# Patient Record
Sex: MALE | Race: AMERICAN INDIAN OR ALASKA NATIVE | NOT HISPANIC OR LATINO | URBAN - METROPOLITAN AREA
[De-identification: names, ages, dates, MRNs, and addresses within clinical notes are randomized per-mention and may not be internally consistent; named-entity substitution may affect disease eponyms.]

---

## 2019-11-11 ENCOUNTER — EMERGENCY (EMERGENCY)
Facility: HOSPITAL | Age: 19
LOS: 1 days | Discharge: HOME | End: 2019-11-11
Attending: EMERGENCY MEDICINE | Admitting: EMERGENCY MEDICINE
Payer: SELF-PAY

## 2019-11-11 VITALS
HEART RATE: 91 BPM | SYSTOLIC BLOOD PRESSURE: 109 MMHG | DIASTOLIC BLOOD PRESSURE: 43 MMHG | RESPIRATION RATE: 16 BRPM | TEMPERATURE: 98 F

## 2019-11-11 PROCEDURE — 99283 EMERGENCY DEPT VISIT LOW MDM: CPT

## 2019-11-11 NOTE — ED ADULT TRIAGE NOTE - CHIEF COMPLAINT QUOTE
Ambulatory c/o cold/flu symptoms from yesterday associated with stomach upset, reports "I was outside without a jacket". Denies fevers, SOB, CP, or PMH

## 2019-11-12 RX ORDER — ACETAMINOPHEN 500 MG
2 TABLET ORAL
Qty: 60 | Refills: 0
Start: 2019-11-12 | End: 2019-11-16

## 2019-11-12 RX ORDER — OXYMETAZOLINE HYDROCHLORIDE 0.5 MG/ML
2 SPRAY NASAL ONCE
Refills: 0 | Status: COMPLETED | OUTPATIENT
Start: 2019-11-12 | End: 2019-11-12

## 2019-11-12 RX ORDER — ONDANSETRON 8 MG/1
8 TABLET, FILM COATED ORAL ONCE
Refills: 0 | Status: COMPLETED | OUTPATIENT
Start: 2019-11-12 | End: 2019-11-12

## 2019-11-12 RX ORDER — ACETAMINOPHEN 500 MG
650 TABLET ORAL ONCE
Refills: 0 | Status: COMPLETED | OUTPATIENT
Start: 2019-11-12 | End: 2019-11-12

## 2019-11-12 RX ORDER — ONDANSETRON 8 MG/1
1 TABLET, FILM COATED ORAL
Qty: 9 | Refills: 0
Start: 2019-11-12 | End: 2019-11-14

## 2019-11-12 RX ADMIN — OXYMETAZOLINE HYDROCHLORIDE 2 SPRAY(S): 0.5 SPRAY NASAL at 01:46

## 2019-11-12 RX ADMIN — Medication 650 MILLIGRAM(S): at 01:47

## 2019-11-12 RX ADMIN — ONDANSETRON 8 MILLIGRAM(S): 8 TABLET, FILM COATED ORAL at 00:49

## 2019-11-12 NOTE — ED PROVIDER NOTE - OBJECTIVE STATEMENT
20 yo M with no Past Medical History that presents with nasal congestion, abd pain with one episode N/V without diarrhea. starting yday after going to [employer] with friends, has + sick contacts at home with similar conditions, has had this three times in last few years. abd pain periumibilcal, dull, achy, non radiating, decreased appetite, 5/10. No meds taken prior to arrival. Moved to [place] from [place] 6 months ago, no other travel. Denies chest pain, SOB, fever, chills, urinary symptoms, headache, vision/hearing changes, rashes. Reports he was cold yday and didn't wear a jacket. Non smoker, no drinking, no drugs.

## 2019-11-12 NOTE — ED PROVIDER NOTE - CLINICAL SUMMARY MEDICAL DECISION MAKING FREE TEXT BOX
20 yo M with no Past Medical History that presents with nasal congestion, one episode N/V and periumbilical abd pain. Benign exam, well appearing otherwise. Tolerating PO. Will provide meds and reassess but highly unlikely needs labs or admission. Well appearing, no medical issues, symptoms x 1 day and has had this three times in past, all improved over time with symptomatic care. VS WNL. Will reassess after meds.

## 2019-11-12 NOTE — ED PROVIDER NOTE - NSFOLLOWUPINSTRUCTIONS_ED_ALL_ED_FT
Please follow up with your primary care doctor after you leave the emergency department so that they can follow up and conduct more testing and treatment as they deem necessary. If you have worsening signs or symptoms of what you came in to the Emergency Department today and are not able to see your doctor, go to your nearest emergency department or return to the [facility] emergency department for further care and management.

## 2019-11-12 NOTE — ED PROVIDER NOTE - PATIENT PORTAL LINK FT
You can access the FollowMyHealth Patient Portal offered by [facility] by registering at the following website: [email or web address]. By joining [employer]’s FollowMyHealth portal, you will also be able to view your health information using other applications (apps) compatible with our system.